# Patient Record
Sex: FEMALE | Race: WHITE
[De-identification: names, ages, dates, MRNs, and addresses within clinical notes are randomized per-mention and may not be internally consistent; named-entity substitution may affect disease eponyms.]

---

## 2017-01-06 ENCOUNTER — HOSPITAL ENCOUNTER (OUTPATIENT)
Dept: HOSPITAL 80 - BRMIMAGING | Age: 52
End: 2017-01-06
Attending: GENERAL ACUTE CARE HOSPITAL
Payer: COMMERCIAL

## 2017-01-06 DIAGNOSIS — Z12.31: Primary | ICD-10-CM

## 2017-01-06 PROCEDURE — G0202 SCR MAMMO BI INCL CAD: HCPCS

## 2017-01-06 NOTE — MA
Screening Digital Mammogram

 

Clinical Indications: Routine screening.  

 

Technique:  Standard cephalocaudal and mediolateral oblique projections are obtained.  This examinati
on is processed by the [employer] computer aided detection system. 

 

Comparison: October 2015, October 2014, September 2013, August 2012 and June 2011

 

Breast density: B; There are scattered fibroglandular densities.

 

Findings: CAD was reviewed.  No suspicious findings are identified.

 

Impression: Negative mammogram. . 

 

BI-RADS 1.

 

Recommendation:   Routine screening is recommended in one year.

 

[facility] will send a result letter to the patient.

 

Negative mammography should not preclude additional workup of a clinically suspicious finding.

 

The patient's information is entered into a reminder system with a target due date for her next mammo
gram.

## 2018-01-25 ENCOUNTER — HOSPITAL ENCOUNTER (OUTPATIENT)
Dept: HOSPITAL 80 - BRMIMAGING | Age: 53
End: 2018-01-25
Attending: OBSTETRICS & GYNECOLOGY
Payer: COMMERCIAL

## 2018-01-25 DIAGNOSIS — Z12.31: Primary | ICD-10-CM

## 2018-11-30 ENCOUNTER — HOSPITAL ENCOUNTER (OUTPATIENT)
Dept: HOSPITAL 80 - BRMIMAGING | Age: 53
End: 2018-11-30
Attending: GENERAL ACUTE CARE HOSPITAL
Payer: COMMERCIAL

## 2018-11-30 DIAGNOSIS — R10.13: Primary | ICD-10-CM

## 2018-11-30 DIAGNOSIS — D18.09: ICD-10-CM

## 2019-06-11 ENCOUNTER — HOSPITAL ENCOUNTER (OUTPATIENT)
Dept: HOSPITAL 80 - BRMIMAGING | Age: 54
End: 2019-06-11
Payer: COMMERCIAL